# Patient Record
Sex: MALE | Race: WHITE
[De-identification: names, ages, dates, MRNs, and addresses within clinical notes are randomized per-mention and may not be internally consistent; named-entity substitution may affect disease eponyms.]

---

## 2017-06-11 ENCOUNTER — HOSPITAL ENCOUNTER (EMERGENCY)
Dept: HOSPITAL 10 - FTE | Age: 22
Discharge: HOME | End: 2017-06-11
Payer: COMMERCIAL

## 2017-06-11 VITALS — HEIGHT: 60 IN | BODY MASS INDEX: 35.28 KG/M2 | WEIGHT: 179.68 LBS
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
## 2017-06-11 DIAGNOSIS — S69.91XA: Primary | ICD-10-CM
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
## 2017-06-11 PROCEDURE — 99283 EMERGENCY DEPT VISIT LOW MDM: CPT

## 2017-06-11 PROCEDURE — 73140 X-RAY EXAM OF FINGER(S): CPT

## 2017-06-11 NOTE — RADRPT
PROCEDURE:   X-ray third finger of the right hand. 

 

CLINICAL INDICATION:   Injury to third finger of the right hand.  Reference marker is in place.

 

TECHNIQUE:   2 views of the third finger of the right hand. 

 

COMPARISON:   None 

 

FINDINGS:

No acute fracture or dislocation.  Soft tissues unremarkable. 

 

IMPRESSION:

No acute fracture.

 

RPTAT: UU

_____________________________________________ 

Physician Cisco           Date    Time 

Electronically viewed and signed by AUGUSTINE Henao Physician on 06/11/2017 02:55 

 

D:  06/11/2017 02:55  T:  06/11/2017 02:55

RS/

## 2017-06-11 NOTE — ERD
ER Documentation


Chief Complaint


Date/Time


DATE: 17 


TIME: 02:52


Chief Complaint


Left middle finger injury at 1800





HPI


This is a 21 year old male presenting to ER with finger injury that occurred 

earlier today.  Patient states he slammed his right 3rd digit in the car door.  

No loss of sensation.  No numbness or tingling.  No limited mobility.  Patient 

states pain has been getting worse.





ROS


All systems reviewed and are negative except as per history of present illness.





Medications


Home Meds


Active Scripts


Ibuprofen* (Motrin*) 600 Mg Tab, 600 MG PO Q6, #15 TAB


   Prov:DEMI KAUR HEAVEN         17


Reported Medications


[Mvi]   No Conflict Check


   12


Ibuprofen* (Ibuprofen*) 200 Mg Capsule


   11





Allergies


Allergies:  


Coded Allergies:  


     No Known Allergies (Verified  Allergy, Mild, 17)





PMhx/Soc


Medical and Surgical Hx:  pt denies Medical Hx, pt denies Surgical Hx


History of Surgery:  No


Anesthesia Reaction:  No


Hx Neurological Disorder:  No


Hx Respiratory Disorders:  No


Hx Cardiac Disorders:  No


Hx Psychiatric Problems:  No


Hx Miscellaneous Medical Probl:  Yes ([facility])


Hx Alcohol Use:  No


Hx Substance Use:  No


Hx Tobacco Use:  No





Physical Exam


Vitals





Vital Signs








  Date Time  Temp Pulse Resp B/P Pulse Ox O2 Delivery O2 Flow Rate FiO2


 


17 01:00 97.7 63 20 120/73 97   








Physical Exam


Const:               No acute distress, alert


Head:   Atraumatic 


Eyes:    Normal Conjunctiva


ENT:    Normal External Ears, Nose and Mouth.


Neck:               Full range of motion..~ No meningismus.


Resp:    Clear to auscultation bilaterally


Cardio:    Regular rate and rhythm, no murmurs


Abd:    Soft, non tender, non distended. Normal bowel sounds


Skin:    No petechiae or rashes


Back:    No midline or flank tenderness


Ext:    No cyanosis, or edema.  sensation fully intact.  no limited mobility.  

radial pulses 2+ bilaterally. 


Neur:    Awake and alert


Psych:    Normal Mood and Affect


Results 24 hrs





 Current Medications








 Medications


  (Trade)  Dose


 Ordered  Sig/Esau


 Route


 PRN Reason  Start Time


 Stop Time Status Last Admin


Dose Admin


 


 Acetaminophen/


 Hydrocodone Bitart


  (Norco (5/325))  1 tab  ONCE  ONCE


 PO


   17 02:00


 17 02:01 DC 17 02:25


 











Procedures/MDM





 DIAGNOSTIC IMAGING REPORT





 Patient: JOE OVGT   : 1995   Age: 21  Sex: M                     

   


 MR #:    [id number]   [facility] #:   [id number]    DOS: 17 0140


 Ordering MD: DEMI KAUR NP   Location:  [facility]   Room/Bed:                    

                        


 








PROCEDURE:   X-ray third finger of the right hand. 


 


CLINICAL INDICATION:   Injury to third finger of the right hand.  Reference 

marker is in place.


 


TECHNIQUE:   2 views of the third finger of the right hand. 


 


COMPARISON:   None 


 


FINDINGS:


No acute fracture or dislocation.  Soft tissues unremarkable. 


 


IMPRESSION:


No acute fracture.


 





MDM:  21 year old male presents to ER for finger injury that occurred earlier 

today.  Patient slammed finger into car door earlier today.  Patient states 

pain is 4-5/10 and patient took ibuprofen at home without relief.  Requesting 

something stronger for pain.  Patient given [place] while in the ED.  XR reviewed 

by radiologist as no acute fracture.  Patient states pain is now tolerable.  

Sensation fully intact.  No neurovascular deficits.





Low suspicion for fracture or dislocation.  





Patient is appropriate for outpatient management and will be given prescription 

for ibuprofen.  Instructed patient to follow-up with primary care provider in 

the next 2-3 days for reassessment.  Return to ED for any high fever, chest pain

, difficulty breathing, shortness breath, wheezing, vomiting, diarrhea, 

abdominal pain or any new or worsening symptoms. Patient verbalizes 

understanding. All questions answered at discharge.





Departure


Diagnosis:  


 Primary Impression:  


 Finger injury


 Encounter type:  initial encounter  Laterality:  right  Qualified Code:  

S69.91XA - Finger injury, right, initial encounter


Condition:  Stable











DEMI KAUR NP 2017 03:06

## 2017-08-11 ENCOUNTER — HOSPITAL ENCOUNTER (EMERGENCY)
Age: 22
Discharge: HOME | End: 2017-08-11

## 2017-08-11 DIAGNOSIS — J06.9: Primary | ICD-10-CM

## 2018-01-01 ENCOUNTER — HOSPITAL ENCOUNTER (EMERGENCY)
Dept: HOSPITAL 91 - FTE | Age: 23
Discharge: HOME | End: 2018-01-01
Payer: COMMERCIAL

## 2018-01-01 ENCOUNTER — HOSPITAL ENCOUNTER (EMERGENCY)
Age: 23
Discharge: HOME | End: 2018-01-01

## 2018-01-01 DIAGNOSIS — R07.9: ICD-10-CM

## 2018-01-01 DIAGNOSIS — R05: Primary | ICD-10-CM

## 2018-01-01 PROCEDURE — 99283 EMERGENCY DEPT VISIT LOW MDM: CPT

## 2018-01-01 PROCEDURE — 71045 X-RAY EXAM CHEST 1 VIEW: CPT

## 2018-01-01 RX ADMIN — IBUPROFEN 1 MG: 600 TABLET ORAL at 11:22
